# Patient Record
Sex: MALE | Race: WHITE | Employment: OTHER | URBAN - METROPOLITAN AREA
[De-identification: names, ages, dates, MRNs, and addresses within clinical notes are randomized per-mention and may not be internally consistent; named-entity substitution may affect disease eponyms.]

---

## 2023-10-30 ENCOUNTER — HOSPITAL ENCOUNTER (OUTPATIENT)
Age: 78
Setting detail: THERAPIES SERIES
Discharge: HOME | End: 2023-10-30
Payer: MEDICARE

## 2023-10-30 PROCEDURE — 97110 THERAPEUTIC EXERCISES: CPT

## 2023-10-30 PROCEDURE — 97161 PT EVAL LOW COMPLEX 20 MIN: CPT

## 2023-11-02 ENCOUNTER — HOSPITAL ENCOUNTER (OUTPATIENT)
Age: 78
Setting detail: THERAPIES SERIES
Discharge: HOME | End: 2023-11-02
Payer: MEDICARE

## 2023-11-02 PROCEDURE — 97110 THERAPEUTIC EXERCISES: CPT

## 2023-11-02 PROCEDURE — 97035 APP MDLTY 1+ULTRASOUND EA 15: CPT

## 2023-11-02 NOTE — FLOWSHEET NOTE
[x] [address] [facility]  Outpatient Rehabilitation &  Therapy  [address]: [phone]  F: [phone]             Physical Therapy Daily Treatment Note    Date:  2023  Patient Name:  Olive Mccall    :    MRN: [id number]  Physician: Wiley Phillips MD       Insurance: MEDICARE  Medical Diagnosis: Bilateral primary osteoarthritis of knee [M17.0]  Rehab Codes: M25.562, M25.561,   Onset Date: 6/10/2021    Next  [address] [place] [address]: unknown  Visit# / total visits: ; Progress note for Medicare patient due at visit 10     Cancels/No Shows: 0/0    Subjective:  Medial R knee pain noted after playing Pickleball for 2 hours this a.m. Pain:  [x] Yes  [] No Location: medial R knee joint line  Pain Rating: (0-10 scale) 5/10  Pain altered Tx:  [x] No  [] Yes  Action:  Comments: Medial R knee joint line is subjectively tender/painful to palpation today. Objective: Bilat knee extension deficits persist.  Modalities: Ultrasound over anteromedial R knee joint line x 8 mins @1.0 w/cm2, 50% power  Precautions: none  Exercises: NP = Not Performed  Exercise Reps/ Time Weight/ Level Comments   Airdyne  5 mins  Seat 9   Leg press - bilat                   - unilat 2 x 15   10 reps  L12  L6   L&R   Calf stretches on stairmaster 5 reps 15 secs each bilat   TKE with tubing 15 reps Dbl blue L&R    Hamstring curls with tubing 2 x 15 reps Dbl blue    LAQs  30 reps 5#                                  Manual stretches for knee extension 5 mins                                                     Other:      Treatment Charges: Mins Units   [x]  Modalities 8 1   [x]  Ther Exercise 25 2   [x]  Manual Therapy 5 0   []  Ther Activities     []  Neuro Re-ed     []  Vasocompression     [] Gait     []  Other     Total Treatment time 38 3   Medicare Cap Total=$184.51    Assessment: [x] Progressing toward goals. Pt reports no L knee pain during today's session. R knee sx were tolerable during activity. [] No change.      []

## 2023-11-06 ENCOUNTER — HOSPITAL ENCOUNTER (OUTPATIENT)
Age: 78
Setting detail: THERAPIES SERIES
Discharge: HOME | End: 2023-11-06
Payer: MEDICARE

## 2023-11-06 PROCEDURE — 97140 MANUAL THERAPY 1/> REGIONS: CPT

## 2023-11-06 PROCEDURE — 97110 THERAPEUTIC EXERCISES: CPT

## 2023-11-06 NOTE — FLOWSHEET NOTE
of Education: [] Verbal  [] Demo  [] Written  Comprehension of Education:  [x] Verbalizes understanding. [] Demonstrates understanding. [] Needs review. [] Demonstrates/verbalizes HEP/Ed previously given.      Plan: [x] Continue current frequency toward long and short term goals on a 2x/week basis   [] Specific Instructions for subsequent treatments: n/a      Time In:1330         Time Out: 1415    Electronically signed by:  Matt Chapa PT

## 2023-11-09 ENCOUNTER — APPOINTMENT (OUTPATIENT)
Age: 78
End: 2023-11-09
Payer: MEDICARE

## 2023-11-09 ENCOUNTER — HOSPITAL ENCOUNTER (OUTPATIENT)
Age: 78
Setting detail: THERAPIES SERIES
Discharge: HOME | End: 2023-11-09
Payer: MEDICARE

## 2023-11-09 PROCEDURE — 97110 THERAPEUTIC EXERCISES: CPT

## 2023-11-09 PROCEDURE — 97140 MANUAL THERAPY 1/> REGIONS: CPT

## 2023-11-14 ENCOUNTER — HOSPITAL ENCOUNTER (OUTPATIENT)
Age: 78
Setting detail: THERAPIES SERIES
Discharge: HOME | End: 2023-11-14
Payer: MEDICARE

## 2023-11-14 PROCEDURE — 97110 THERAPEUTIC EXERCISES: CPT

## 2023-11-14 PROCEDURE — 97140 MANUAL THERAPY 1/> REGIONS: CPT

## 2023-11-14 NOTE — FLOWSHEET NOTE
[x] [facility]37 [facility]  Outpatient Rehabilitation &  Therapy  [address]: [phone]  F: [phone]             Physical Therapy Daily Treatment Note    Date:  2023  Patient Name:  Olive Mccall    :    MRN: [id number]  Physician: Wiley Phillips MD       Insurance: MEDICARE  Medical Diagnosis: Bilateral primary osteoarthritis of knee [M17.0]  Rehab Codes: M25.562, M25.561,   Onset Date: 6/10/2021    Next Dr. Tammy Hopkins: unknown  Visit# / total visits: ; Progress note for Medicare patient due at visit 10     Cancels/No Shows: 0/0    Subjective:  Pt states his medial R knee pain was more pronounced after driving from [facility], [address] [place] [address] this weekend. The static position while driving that increases symptoms. Pain:  [x] Yes  [] No Location: medial R knee Pain Rating: (0-10 scale) 4/10  Pain altered Tx:  [x] No  [] Yes  Action:  Comments: Medial R knee joint line is subjectively tender/painful to palpation today. Objective: Gait is not antalgic today on the RLE, but pt does not attain full knee extension in swing phase. Modalities:   Precautions: none  Exercises: NP = Not Performed  Exercise Reps/ Time Weight/ Level Comments   Airdyne  5 mins  Seat 9   Leg press - bilat                   - unilat 2 x 15   2 x 10 reps  L14  L6.5   L&R   Calf stretches on stairmaster 5 reps 15 secs each bilat   TKE with tubing 15 reps Dbl blue L&R    BATCA hamstring curls   2 x 10 reps 35#    BATCA knee extension 2 x 10 reps 35#    Manual hamstring stretches  5 x 15 secs  L&R         Lateral stepovers  10 reps  8\" box                Manual stretches for knee extension 5 mins     Manual stretches of hip flexors/quads 5 x 15 secs  L&R                                              Other:  Access Code: [email or web address]  URL: [email or web address]  Date: 10/30/2023  Prepared by: Laura Freed     Exercises  - Supine Bridge  - 1-2 x daily - 5 x weekly - 2 sets - 12 reps - 2 secs hold  - Sidelying Hip

## 2023-11-17 ENCOUNTER — HOSPITAL ENCOUNTER (OUTPATIENT)
Age: 78
Setting detail: THERAPIES SERIES
Discharge: HOME | End: 2023-11-17
Payer: MEDICARE

## 2023-11-17 PROCEDURE — 97110 THERAPEUTIC EXERCISES: CPT

## 2023-11-17 PROCEDURE — 97140 MANUAL THERAPY 1/> REGIONS: CPT

## 2023-11-17 NOTE — FLOWSHEET NOTE
[x] [address]37 [facility]  Outpatient Rehabilitation &  Therapy  [address]: [phone]  F: [phone]             Physical Therapy Daily Treatment Note    Date:  2023  Patient Name:  Nitza Helton    :    MRN: [id number]  Physician: Robbin Cook MD       Insurance: MEDICARE  Medical Diagnosis: Bilateral primary osteoarthritis of knee [M17.0]  Rehab Codes: M25.562, M25.561,   Onset Date: 6/10/2021    Next  [address] [place] [address] [place]: unknown  Visit# / total visits: ; Progress note for Medicare patient due at visit 10     Cancels/No Shows: 0/0    Subjective:  Pt states his medial R knee pain is still prevalent while driving even for local distances. Pain/stiffness noted this a.m. upon arising, but pt played Pickleball this morning and now bilat knees feel fairly loose. .  Pain:  [x] Yes  [] No Location: medial R knee Pain Rating: (0-10 scale) 1/10  Pain altered Tx:  [x] No  [] Yes  Action:  Comments: Medial R knee joint line is subjectively tender/painful to palpation today. Objective: Gait is not antalgic today on the RLE, but pt does not attain full knee extension in swing phase. Modalities:   Precautions: none  Exercises: NP = Not Performed  Exercise Reps/ Time Weight/ Level Comments   Airdyne  5 mins  Seat 9   Leg press - bilat                   - unilat 2 x 15   2 x 10 reps  L14  L7   L&R   Calf stretches on stairmaster 5 reps 15 secs each bilat   TKE with tubing 15 reps Dbl blue L&R    Hamstring curls   2 x 15 reps Blue tubing (13#)    BATCA knee extension 2 x 10 reps 35#    Manual hamstring stretches  5 x 15 secs  L&R         Lateral stepovers  10 reps  8\" box                Manual stretches for knee extension 5 mins     Manual stretches of hip flexors/quads 5 x 15 secs  L&R                                              Other:  Access Code: [email or web address]  URL: [email or web address]  Date: 10/30/2023  Prepared by: Carlton Willard     Exercises  - Supine Bridge  - 1-2 x daily - 5 x

## 2023-11-21 ENCOUNTER — HOSPITAL ENCOUNTER (OUTPATIENT)
Age: 78
Setting detail: THERAPIES SERIES
Discharge: HOME | End: 2023-11-21
Payer: MEDICARE

## 2023-11-21 PROCEDURE — 97110 THERAPEUTIC EXERCISES: CPT

## 2023-11-21 PROCEDURE — 97140 MANUAL THERAPY 1/> REGIONS: CPT

## 2023-11-21 NOTE — FLOWSHEET NOTE
[x] 937 [facility]  Outpatient Rehabilitation &  Therapy  [address]: [phone]  F: [phone]             Physical Therapy Daily Treatment Note    Date:  2023  Patient Name:  Olive Mccall    :  [birth year]  MRN: [id number]  Physician: Wiley Phillips MD       Insurance: MEDICARE  Medical Diagnosis: Bilateral primary osteoarthritis of knee [M17.0]  Rehab Codes: M25.562, M25.561,   Onset Date: 6/10/2021    Next Dr. Tammy Hopkins: unknown  Visit# / total visits: ; Progress note for Medicare patient due at visit 10     Cancels/No Shows: 0/0    Subjective:  Pt states he has minimal knee pain today. He did play Pickleball this a.m. without sx increase. He has driven without increased medial R knee pain. Pain:  [x] Yes  [] No Location: medial R knee Pain Rating: (0-10 scale) 1/10  Pain altered Tx:  [x] No  [] Yes  Action:  Comments: Medial R knee joint line is subjectively tender/painful to palpation today. Objective: Active knee extension in supine lacks 10 degrees for R knee and 8 degrees for L knee  Modalities:   Precautions: none  Exercises: NP = Not Performed  Exercise Reps/ Time Weight/ Level Comments   Airdyne  6 mins  Seat 9 - conditioning   Leg press - bilat                   - unilat 2 x 10  2 x 10 reps  L14  L7   L&R   Calf stretches on stairmaster 5 reps 15 secs each bilat   TKE with tubing 15 reps Dbl blue L&R    Hamstring curls   2 x 15 reps Blue tubing (13#)    BATCA knee extension 3 x 10 reps 35#    Manual hamstring stretches  5 x 15 secs  L&R         Lateral stepovers  10 reps  8\" box                Manual stretches for knee extension 5 mins     Manual stretches of hip flexors/quads 5 x 15 secs  L&R                                              Other:  Access Code: [email or web address]  URL: [email or web address]  Date: 10/30/2023  Prepared by: Laura Freed     Exercises  - Supine Bridge  - 1-2 x daily - 5 x weekly - 2 sets - 12 reps - 2 secs hold  - Sidelying Hip Abduction  -

## 2023-11-28 ENCOUNTER — HOSPITAL ENCOUNTER (OUTPATIENT)
Age: 78
Setting detail: THERAPIES SERIES
Discharge: HOME | End: 2023-11-28
Payer: MEDICARE

## 2023-11-28 PROCEDURE — 97140 MANUAL THERAPY 1/> REGIONS: CPT

## 2023-11-28 PROCEDURE — 97110 THERAPEUTIC EXERCISES: CPT

## 2023-11-28 NOTE — FLOWSHEET NOTE
in the home and community. Patient goals: \"aleviate pain and stiffness, avoid surgery\" (knee replacements)    Pt. Education:  [x] Yes  [] No  [x] Reviewed Prior HEP/Ed  Method of Education: [] Verbal  [] Demo  [] Written  Comprehension of Education:  [x] Verbalizes understanding. [] Demonstrates understanding. [] Needs review. [] Demonstrates/verbalizes HEP/Ed previously given.      Plan: [x] Continue current frequency toward long and short term goals on a 2x/week basis   [] Specific Instructions for subsequent treatments: n/a      Time In: 1145     Time Out: 1235    Electronically signed by:  Ashlyn Mercado, PT

## 2023-11-30 ENCOUNTER — HOSPITAL ENCOUNTER (OUTPATIENT)
Age: 78
Setting detail: THERAPIES SERIES
Discharge: HOME | End: 2023-11-30
Payer: MEDICARE

## 2023-11-30 PROCEDURE — 97140 MANUAL THERAPY 1/> REGIONS: CPT

## 2023-11-30 PROCEDURE — 97110 THERAPEUTIC EXERCISES: CPT

## 2023-11-30 NOTE — FLOWSHEET NOTE
[x] [address]37 [facility]  Outpatient Rehabilitation &  Therapy  [address]: [phone]  F: [phone]             Physical Therapy Daily Treatment Note    Date:  2023  Patient Name:  Syl Denton    :  1945  MRN: [id number]  Physician: Andra Tijerina MD       Insurance: MEDICARE  Medical Diagnosis: Bilateral primary osteoarthritis of knee [M17.0]  Rehab Codes: M25.562, M25.561,   Onset Date: 6/10/2021    Next  [address] [place] [address] [place]: unknown  Visit# / total visits: ; Progress note for Medicare patient due at visit 10     Cancels/No Shows: 0/0    Subjective:  Pt reports he still has episodes of R>L knee pain when in long sitting or driving for long distances, but thinks improvement is noted regarding intensity of sx. Pain:  [x] Yes  [] No Location: medial R knee Pain Rating: (0-10 scale) 1/10  Pain altered Tx:  [x] No  [] Yes  Action:  Comments: Medial R knee joint line is minimalloy painful today    Objective: Active knee extension in supine lacks 10 degrees for R knee and 8 degrees for L knee  Modalities:   Precautions: none  Exercises: NP = Not Performed  Exercise Reps/ Time Weight/ Level Comments   Airdyne  6 mins  Seat 9 - conditioning   Leg press - bilat                   - unilat 3 x 10  2 x 10 reps  L14.5  L7.5   L&R   Calf stretches on stairmaster 5 reps 15 secs each bilat   TKE with tubing 15 reps Dbl blue L&R    Hamstring curls   2 x 15 reps Blue tubing (13#)    BATCA knee extension 3 x 10 reps 45#          Single leg stance 3 x 15 secs   L&R   Lateral stepovers  10 reps  8\" box    Heel/toe raises with 2 second hold at extreme of motion 15 reps           Manual stretches for knee extension NP     Manual stretches of hip flexors/quads 5 x 15 secs  L&R    Manual hamstring stretches  5 x 15 secs L&R                                        Other:  Access Code: [email or web address]  URL: [email or web address]  Date: 10/30/2023  Prepared by: Johnny Garcia  - Supine Bridge  - 1-2

## 2023-12-12 ENCOUNTER — HOSPITAL ENCOUNTER (OUTPATIENT)
Age: 78
Setting detail: THERAPIES SERIES
Discharge: HOME | End: 2023-12-12
Payer: MEDICARE

## 2023-12-12 PROCEDURE — 97140 MANUAL THERAPY 1/> REGIONS: CPT

## 2023-12-12 PROCEDURE — 97110 THERAPEUTIC EXERCISES: CPT

## 2023-12-12 NOTE — FLOWSHEET NOTE
[x] 937 [facility]  Outpatient Rehabilitation &  Therapy  [address] [facility] [address]: [phone]  F: [phone]             Physical Therapy Daily Treatment Note / Discharge Summary    Date:  2023  Patient Name:  Coco Lock    :    MRN: [id number]  Physician: Serina Hahn MD       Insurance: MEDICARE  Medical Diagnosis: Bilateral primary osteoarthritis of knee [M17.0]  Rehab Codes: M25.562, M25.561,   Onset Date: 6/10/2021    Next  [address] [facility]: unknown  Visit# / total visits: 10th and Last session is today     Cancels/No Shows: 0/0    Subjective:  Pt reports he was able to drive to and from [facility] without knee pain. He does notice some knee discomfort at night when lying in bed. He rates his improvement in bilat knee sx and function at 80%. Pain:  [] Yes  [x] No Location: n/a Pain Rating: (0-10 scale) 0/10  Pain altered Tx:  [x] No  [] Yes  Action:  Comments: Medial R knee joint line is not painful today    Objective: (2023) Active knee extension in supine lacks 6 degrees for R knee and 8 degrees for L knee. Bilat LE strength is grossly 5/5. Modalities: none  Precautions: none  Exercises: NP = Not Performed  Exercise Reps/ Time Weight/ Level Comments   Airdyne  6 mins  Seat 9 - conditioning   Leg press - bilat                   - unilat 3 x 10  2 x 10 reps  L14.5  L7.5   L&R   Calf stretches on stairmaster 5 reps 15 secs each bilat   TKE with tubing 15 reps Dbl blue L&R    Hamstring curls   2 x 15 reps Blue tubing (13#)    BATCA knee extension 3 x 10 reps 45#          Single leg stance 3 x 15 secs   L&R   Lateral stepovers  10 reps  8\" box    Heel/toe raises with 2 second hold at extreme of motion 15 reps           Manual stretches for knee extension NP     Manual stretches of hip flexors/quads 5 x 15 secs  L&R    Manual hamstring stretches  5 x 15 secs L&R                                        Other:  Access Code: [email or web address]  URL: [email or web address]  Date:

## 2023-12-14 ENCOUNTER — HOSPITAL ENCOUNTER (OUTPATIENT)
Age: 78
Setting detail: THERAPIES SERIES
Discharge: HOME | End: 2023-12-14
Payer: MEDICARE

## 2023-12-14 PROCEDURE — 97110 THERAPEUTIC EXERCISES: CPT

## 2023-12-14 PROCEDURE — 97161 PT EVAL LOW COMPLEX 20 MIN: CPT

## 2023-12-14 NOTE — CONSULTS
Time out:1500    Electronically signed by: Ryan Dye PT        Physician Signature:________________________________Date:__________________  By signing above or cosigning this note, I have reviewed this plan of care and certify a need for medically necessary rehabilitation services.      *PLEASE SIGN ABOVE AND FAX BACK ALL PAGES*

## 2023-12-28 ENCOUNTER — HOSPITAL ENCOUNTER (OUTPATIENT)
Age: 78
Setting detail: THERAPIES SERIES
Discharge: HOME | End: 2023-12-28
Payer: MEDICARE

## 2023-12-28 PROCEDURE — 97110 THERAPEUTIC EXERCISES: CPT

## 2023-12-28 PROCEDURE — 97140 MANUAL THERAPY 1/> REGIONS: CPT

## 2023-12-28 NOTE — FLOWSHEET NOTE
intensity during normal ADL. Independent with Home Exercise Programs    LTG (to be met in 20 treatments)  Pt will report 1/10 maximum low back sx during normal ADL and function in the community with minimal deficits. Pt will report successful management of low back sx via posture correction, exercise and body proper mechanics. Pt. Education:  [x] Yes - posture awareness in stance  [] No  [] Reviewed Prior HEP/Ed  Method of Education: [x] Verbal  [x] Demo  [] Written  Comprehension of Education:  [x] Verbalizes understanding. [] Demonstrates understanding. [] Needs review. [] Demonstrates/verbalizes HEP/Ed previously given.      Plan: [x] Continue current frequency toward long and short term goals on a 2x/week basis  [x] Specific Instructions for subsequent treatments: Lumbar ROM, core strength, mobilization of lumbar spine/tissue      Time In: 1415            Time Out: 1500    Electronically signed by:  Sarahi Benavidez, PT

## 2023-12-29 ENCOUNTER — HOSPITAL ENCOUNTER (OUTPATIENT)
Age: 78
Setting detail: THERAPIES SERIES
Discharge: HOME | End: 2023-12-29
Payer: MEDICARE

## 2023-12-29 PROCEDURE — 97110 THERAPEUTIC EXERCISES: CPT

## 2023-12-29 PROCEDURE — 97140 MANUAL THERAPY 1/> REGIONS: CPT

## 2023-12-29 NOTE — FLOWSHEET NOTE
ex rx from evaluation  Access Code: [email or web address]  URL: [email or web address]  Date: 12/14/2023  Prepared by: Conda Pat     Exercises  - Supine Double Knee to Chest  - 1-2 x daily - 3 x weekly - 1 sets - 10 reps - 5 seconds hold  - Supine Lower Trunk Rotation  - 1-2 x daily - 3 x weekly - 1 sets - 12 reps - 5 secs hold  - Supine Piriformis Stretch  - 1-2 x daily - 3 x weekly - 1 sets - 10 reps - 5 seconds hold  - Supine Piriformis Stretch (Mirrored)  - 1-2 x daily - 3 x weekly - 1 sets - 10 reps - 5 seconds hold  - Supine Transversus Abdominis Bracing - Hands on Ground  - 1-2 x daily - 3 x weekly - 1 sets - 10 reps - 2 hold  - Seated Lumbar Flexion Stretch  - 1-2 x daily - 3 x weekly - 1 sets - 5 reps - 5 secs hold    Treatment Charges: Mins Units   [x]  Modalities-US 8 0   [x]  Ther Exercise 30 2   [x]  Manual Therapy 10 1   []  Ther Activities     []  Neuro Re-ed     []  Vasocompression     [] Gait     []  Other     Total Billable time 40 3   Medicare cap total = $1144.40    Assessment: [x] Progressing toward goals. Pt's sx responded favorably to US today. [] No change. [] Other:  [x] Patient would continue to benefit from skilled physical therapy services in order to reduce muscular tightness and spasm via exercise, manual therapy and appropriate modalities. He will benefit from education for posture correction, body mechanics modification and sx management to return him to all normal ADL without deficits. STG (to be met in 10 treatments)  ? Pain: Pt will report 2/10 maximum low back pain during transfers and normal ADL intensity. ? ROM: Pt will demonstrate a 10% increase in all lumbar motion to facilitate function in the home and community with decreasing deficits. ? Posture: Pt will demonstrate a working knowledge of proper posture and body mechanics without verbal cues to reduce abnormal stress on lumbar spine. ?  Function: Pt will reports a 50% decrease in sx frequency and intensity during [Follow-Up Visit] : a follow-up visit for [Spouse] : spouse [FreeTextEntry2] : left ankle

## 2024-01-04 ENCOUNTER — HOSPITAL ENCOUNTER (OUTPATIENT)
Age: 79
Setting detail: THERAPIES SERIES
Discharge: HOME | End: 2024-01-04
Payer: MEDICARE

## 2024-01-04 PROCEDURE — 97140 MANUAL THERAPY 1/> REGIONS: CPT

## 2024-01-04 PROCEDURE — 97110 THERAPEUTIC EXERCISES: CPT

## 2024-01-04 PROCEDURE — 97012 MECHANICAL TRACTION THERAPY: CPT

## 2024-01-04 NOTE — FLOWSHEET NOTE
[x] [facility]  Outpatient Rehabilitation &  Therapy  [address] [facility].   P: [phone]  F: [phone]             Physical Therapy Daily Treatment Note    Date:  2024  Patient Name:  Cody Palacio    :  1945  MRN: [id number]  Physician: Jah Barrera MD      Insurance: MEDICARE / [facility](Based on medical necessity, no co-pay)   Medical Diagnosis: Strain of muscle, fascia and tendon of lower back, initial encounter [S39.012A        Rehab Codes: M54.50, M54.9, M54.59, M51.36, M62.830    Onset Date:       Next  Appt: Unknown  Visit# / total visits: ; Progress note for Medicare patient due at visit 10     Cancels/No Shows: 0/0    Subjective:  Pt states he continues to have L L/S pain that lingers at 2/10, but raises to 4/10 with sit to stand transfers and playing Pickleball.  Pain:  [x] Yes  [] No Location: bilat lumbar paraspinals  Pain Rating: (0-10 scale) 2/10  Pain altered Tx:  [x] No  [] Yes  Action:  Comments: L low back feels less stiff.    Objective: Pt is tender to palpation of central L3 and L4 as well as L lumbar paraspinals  Modalities: Pelvic traction, supine with flexion stool x 12 mins @ 55#/35# on a 60\"/10\" cycle    Exercises: NP=Not Performed  Exercise Reps/ Time Weight/ Level Comments   Sidestepping with T-tube for abdominal activation 15 reps   L & R Dbl blue    Rolling Triggerpoint roll up wall for lumbar extension 10 reps  NP   Standing lat pulls for abdominal activation 2 x 10 reps  25# NP   Mini-crunches 2 x 10 reps     Hooklying bridges 2x10 reps 2 x 10 reps    Hooklying marching 15 reps 4#    R sidelying spinal rotation with manual overpressure 10 x 5 secs each  NP   Bird dog 10 reps   NP               Airdyne  - seat 7 5 mins NP Alternate UE+LE / UE only each minute   Passive hamstring stretches 5 x 10 secs   L&R          Manual therapy  10  mins Grade IV Bilat UPAs, UPAs, Screw mobilization   Soft tissue mobilization  5 mins Grade IV L>R

## 2024-01-09 ENCOUNTER — HOSPITAL ENCOUNTER (OUTPATIENT)
Age: 79
Setting detail: THERAPIES SERIES
Discharge: HOME | End: 2024-01-09
Payer: MEDICARE

## 2024-01-09 PROCEDURE — 97012 MECHANICAL TRACTION THERAPY: CPT

## 2024-01-09 PROCEDURE — 97110 THERAPEUTIC EXERCISES: CPT

## 2024-01-09 NOTE — FLOWSHEET NOTE
[x] [facility]  Outpatient Rehabilitation &  Therapy  [address] [facility].   P: [phone]  F: [phone]             Physical Therapy Daily Treatment Note    Date:  2024  Patient Name:  Cody Palacio    :  1945  MRN: [id number]  Physician: Jah Barrera MD      Insurance: MEDICARE / [facility](Based on medical necessity, no co-pay)   Medical Diagnosis: Strain of muscle, fascia and tendon of lower back, initial encounter [S39.012A        Rehab Codes: M54.50, M54.9, M54.59, M51.36, M62.830    Onset Date:       Next  Appt: Unknown  Visit# / total visits: ; Progress note for Medicare patient due at visit 10     Cancels/No Shows: 0/0    Subjective:  Pt states he has no back sx today, but he didn't play Pickleball today.  Pain:  [] Yes  [x] No Location: bilat lumbar paraspinals  Pain Rating: (0-10 scale) 0/10  Pain altered Tx:  [x] No  [] Yes  Action:  Comments: Pt states he seems to have his most discomfort when he plays Pickleball > 2 hours.    Objective: Pt is tender to palpation of central L3 and L4 as well as L lumbar paraspinals  Modalities: Pelvic traction, supine with flexion stool x 15 mins @ 60#/35# on a 60\"/10\" cycle    Exercises: NP=Not Performed  Exercise Reps/ Time Weight/ Level Comments   Sidestepping with T-tube for abdominal activation 15 reps   L & R Dbl blue    Rolling Triggerpoint roll up wall for lumbar extension 10 reps  NP   Standing lat pulls for abdominal activation 2 x 10 reps  25# NP   Mini-crunches 2 x 10 reps     Hooklying bridges 2x10 reps     Hooklying marching 15 reps 4#    Dbl knee raise holding ball between knees 10 reps     Bird dog 10 reps   NP               Airdyne  - seat 7 5 mins NP Alternate UE+LE / UE only each minute   Passive hamstring stretches 5 x 10 secs   L&R          Manual therapy  NP Grade IV Bilat UPAs, UPAs, Screw mobilization   Soft tissue mobilization  NP Grade IV L>R lumbar paraspinals  Manual and Theragun    Standing R

## 2024-01-11 ENCOUNTER — HOSPITAL ENCOUNTER (OUTPATIENT)
Age: 79
Setting detail: THERAPIES SERIES
Discharge: HOME | End: 2024-01-11
Payer: MEDICARE

## 2024-01-11 PROCEDURE — 97110 THERAPEUTIC EXERCISES: CPT

## 2024-01-11 PROCEDURE — 97012 MECHANICAL TRACTION THERAPY: CPT

## 2024-01-11 NOTE — FLOWSHEET NOTE
ROM: Pt will demonstrate a 10% increase in all lumbar motion to facilitate function in the home and community with decreasing deficits.  ? Posture: Pt will demonstrate a working knowledge of proper posture and body mechanics without verbal cues to reduce abnormal stress on lumbar spine.  ? Function: Pt will reports a 50% decrease in sx frequency and intensity during normal ADL.  Independent with Home Exercise Programs    LTG (to be met in 20 treatments)  Pt will report 1/10 maximum low back sx during normal ADL and function in the community with minimal deficits.  Pt will report successful management of low back sx via posture correction, exercise and body proper mechanics.    Pt. Education:  [x] Yes - posture awareness   [] No  [] Reviewed Prior HEP/Ed  Method of Education: [x] Verbal  [x] Demo  [] Written  Comprehension of Education:  [x] Verbalizes understanding.  [] Demonstrates understanding.  [] Needs review.  [] Demonstrates/verbalizes HEP/Ed previously given.     Plan: [x] Continue current frequency toward long and short term goals on a 2x/week basis  [x] Specific Instructions for subsequent treatments: Lumbar ROM, core strength, mobilization of lumbar spine/tissue      Time In: 1145         Time Out: 1235    Electronically signed by:  Keon Burks, PT

## 2024-01-16 ENCOUNTER — HOSPITAL ENCOUNTER (OUTPATIENT)
Age: 79
Setting detail: THERAPIES SERIES
Discharge: HOME | End: 2024-01-16
Payer: MEDICARE

## 2024-01-16 PROCEDURE — 97110 THERAPEUTIC EXERCISES: CPT

## 2024-01-16 PROCEDURE — 97012 MECHANICAL TRACTION THERAPY: CPT

## 2024-01-16 NOTE — FLOWSHEET NOTE
[x] [facility]  Outpatient Rehabilitation &  Therapy  [address] [facility].   P: [phone]  F: [phone]             Physical Therapy Daily Treatment Note    Date:  2024  Patient Name:  Cody Palacio    :  1945  MRN: [id number]  Physician: Jah Barrera MD      Insurance: MEDICARE / [facility](Based on medical necessity, no co-pay)   Medical Diagnosis: Strain of muscle, fascia and tendon of lower back, initial encounter [S39.012A        Rehab Codes: M54.50, M54.9, M54.59, M51.36, M62.830    Onset Date:       Next  Appt: Unknown  Visit# / total visits: ; Progress note for Medicare patient due at visit 10     Cancels/No Shows: 0/0    Subjective:  Pt states he has minimal low back stiffness and none of what he would call pain. He states he thinks the traction is helpful.   Pain:  [] Yes  [x] No Location: bilat lumbar paraspinals  Pain Rating: (0-10 scale) 0/10  Pain altered Tx:  [x] No  [] Yes  Action:  Comments: Pt states he played 3 games of Pickle ball today without sx provocation.    Objective: L lumbar paraspinal tightness has greatly decreased.   Modalities: Pelvic traction, supine with flexion stool x 15 mins @ 65#/40# on a 60\"/10\" cycle    Exercises: NP=Not Performed  Exercise Reps/ Time Weight/ Level Comments   Sidestepping with T-tube for abdominal activation 15 reps   L & R Dbl blue NP   Rolling Triggerpoint roll up wall for lumbar extension 10 reps  NP   Standing lat pulls for abdominal activation 2 x 10 reps  25# NP   Mini-crunches 2 x 10 reps     Hooklying bridges 2x10 reps  NP   Hooklying marching with knee & hip extension 15 reps 4#    Dbl knee raise holding ball between knees 2 x 10 reps     Bird dog 10 reps                  Airdyne  - seat 7 5 mins  Alternate UE+LE / UE only each minute   Passive hamstring stretches 5 x 10 secs  NP L&R          Manual therapy  NP Grade IV Bilat UPAs, UPAs, Screw mobilization   Soft tissue mobilization  NP Grade IV L>R

## 2024-01-18 ENCOUNTER — APPOINTMENT (OUTPATIENT)
Age: 79
End: 2024-01-18
Payer: MEDICARE

## 2024-01-18 ENCOUNTER — HOSPITAL ENCOUNTER (OUTPATIENT)
Age: 79
Setting detail: THERAPIES SERIES
Discharge: HOME | End: 2024-01-18
Payer: MEDICARE

## 2024-01-18 PROCEDURE — 97012 MECHANICAL TRACTION THERAPY: CPT

## 2024-01-18 PROCEDURE — 97110 THERAPEUTIC EXERCISES: CPT

## 2024-01-18 NOTE — FLOWSHEET NOTE
[x] [facility]  Outpatient Rehabilitation &  Therapy  [address].   P: [phone]  F: [phone]             Physical Therapy Progress Note / Daily Treatment Note    Date:  2024  Patient Name:  Cody Palacio    :  1945  MRN: [id number]  Physician: Jah Barrera MD      Insurance: MEDICARE / [facility](Based on medical necessity, no co-pay)   Medical Diagnosis: Strain of muscle, fascia and tendon of lower back, initial encounter [S39.012A        Rehab Codes: M54.50, M54.9, M54.59, M51.36, M62.830    Onset Date:       Next  Appt: Unknown  Visit# / total visits: 10/20; Progress note for Medicare patient due at visit 10     Cancels/No Shows: 0/0    Subjective:  Pt states he has no low back or LE pain today. He will drive to [facility] today which will test his state of rehab.   Pain:  [] Yes  [x] No Location: bilat lumbar paraspinals  Pain Rating: (0-10 scale) 0/10  Pain altered Tx:  [x] No  [] Yes  Action:  Comments: Pt states he is experiencing an 80% decrease in low back sx frequency and intensity.    Objective: Lumbar AROM in % of normal range = flexion 90%, extension 75, lateral flexion L  75 R 75,  rotation L 75 R 75. No pain reporduced with lumbar AROM.  Modalities: Pelvic traction, supine with flexion stool x 15 mins @ 65#/40# on a 60\"/10\" cycle    Exercises: NP=Not Performed  Exercise Reps/ Time Weight/ Level Comments   Sidestepping with T-tube for abdominal activation 15 reps   L & R Dbl blue NP   Rolling Triggerpoint roll up wall for lumbar extension 10 reps  NP   Standing lat pulls for abdominal activation 15 reps  25#    Mini-crunches 2 x 10 reps     Hooklying bridges 12 reps     Hooklying marching with knee & hip extension 15 reps 4#    Dbl knee raise holding ball between knees 2 x 10 reps     Bird dog 10 reps                  Airdyne  - seat 7 5 mins  Alternate UE+LE / UE only each minute   Passive hamstring stretches 5 x 10 secs  NP L&R

## 2024-01-30 ENCOUNTER — APPOINTMENT (OUTPATIENT)
Age: 79
End: 2024-01-30
Payer: MEDICARE

## 2024-01-30 ENCOUNTER — HOSPITAL ENCOUNTER (OUTPATIENT)
Age: 79
Setting detail: THERAPIES SERIES
Discharge: HOME | End: 2024-01-30
Payer: MEDICARE

## 2024-01-30 PROCEDURE — G0283 ELEC STIM OTHER THAN WOUND: HCPCS

## 2024-01-30 PROCEDURE — 97012 MECHANICAL TRACTION THERAPY: CPT

## 2024-01-30 PROCEDURE — 97110 THERAPEUTIC EXERCISES: CPT

## 2024-01-30 NOTE — FLOWSHEET NOTE
[x] [facility]  Outpatient Rehabilitation &  Therapy  [address] [facility] [address] [facility].   P: [phone]  F: [phone]             Physical Therapy Daily Treatment Note    Date:  2024  Patient Name:  Cody Palacio    :  1945  MRN: [id number]  Physician: Jah Barrera MD      Insurance: MEDICARE / [facility](Based on medical necessity, no co-pay)   Medical Diagnosis: Strain of muscle, fascia and tendon of lower back, initial encounter [S39.012A        Rehab Codes: M54.50, M54.9, M54.59, M51.36, M62.830    Onset Date:       Next  Appt: Unknown  Visit# / total visits: ; Progress note for Medicare patient due at visit 10     Cancels/No Shows: 0/0    Subjective:  Pt states he has no low back today even though he played Pickleball x 3 hours yesterday and 2 hours today.  Pain:  [] Yes  [x] No Location: bilat lumbar paraspinals  Pain Rating: (0-10 scale) 0/10  Pain altered Tx:  [x] No  [] Yes  Action:  Comments: Pt states he is experiencing an 80% decrease in low back sx frequency and intensity.    Objective: Lumbar AROM in % of normal range = flexion 90%, extension 75, lateral flexion L  75 R 75,  rotation L 75 R 75. No pain reporduced with lumbar AROM.  Modalities: Pelvic traction, supine with flexion stool x 15 mins @ 65#/40# on a 60\"/10\" cycle    Exercises: NP=Not Performed  Exercise Reps/ Time Weight/ Level Comments   Sidestepping with T-tube for abdominal activation 15 reps   L & R Dbl blue NP   Rolling Triggerpoint roll up wall for lumbar extension 10 reps     Standing lat pulls for abdominal activation 2 x 10  reps  25#    Mini-crunches 2 x 10 reps NP    Hooklying bridges 12 reps     Hooklying marching with knee & hip extension 15 reps 4#    Dbl knee raise holding ball between knees 2 x 10 reps     Bird dog 10 reps                  Airdyne  - seat 7 5 mins  Alternate UE+LE / UE only each minute   Passive hamstring stretches 5 x 10 secs  NP L&R          Manual therapy  5 mins

## 2024-02-01 ENCOUNTER — HOSPITAL ENCOUNTER (OUTPATIENT)
Age: 79
Setting detail: THERAPIES SERIES
Discharge: HOME | End: 2024-02-01
Payer: MEDICARE

## 2024-02-01 PROCEDURE — 97110 THERAPEUTIC EXERCISES: CPT

## 2024-02-01 PROCEDURE — 97012 MECHANICAL TRACTION THERAPY: CPT

## 2024-02-01 NOTE — FLOWSHEET NOTE
Airdyne  - seat 7 5 mins  Alternate UE+LE / UE only each minute   Passive hamstring stretches 5 x 10 secs  NP L&R          Manual therapy  5 mins Grade IV Bilat UPAs, UPAs, Screw mobilization   Soft tissue mobilization  NP Grade IV L>R lumbar paraspinals  Manual and Theragun    Standing R sidebends with L arm on wall 10 x 5 secs each                                   Other: Home ex rx from evaluation  Access Code: [email or web address]  URL: [email or web address]  Date: 01/04/2024  Prepared by: Keon Burks    Exercises  - Standing Sidebends  - 2 x daily - 5 x weekly - 1 sets - 5 reps - 10 hold  Access Code: [email or web address]  URL: [email or web address]  Date: 12/14/2023  Prepared by: Keon Burks     Exercises  - Supine Double Knee to Chest  - 1-2 x daily - 3 x weekly - 1 sets - 10 reps - 5 seconds hold  - Supine Lower Trunk Rotation  - 1-2 x daily - 3 x weekly - 1 sets - 12 reps - 5 secs hold  - Supine Piriformis Stretch  - 1-2 x daily - 3 x weekly - 1 sets - 10 reps - 5 seconds hold  - Supine Piriformis Stretch (Mirrored)  - 1-2 x daily - 3 x weekly - 1 sets - 10 reps - 5 seconds hold  - Supine Transversus Abdominis Bracing - Hands on Ground  - 1-2 x daily - 3 x weekly - 1 sets - 10 reps - 2 hold  - Seated Lumbar Flexion Stretch  - 1-2 x daily - 3 x weekly - 1 sets - 5 reps - 5 secs hold    Access Code: [email or web address]  URL: [email or web address]  Date: 02/01/2024  Prepared by: Keon Burks    Program Notes  Use your hands on the arms of the chair to increase the stretch intensity    Exercises  - Seated Thoracic Flexion and Rotation with Arms Crossed  - 1-2 x daily - 3-4 x weekly - 1 sets - 10 reps - 5 secs. hold  Treatment Charges: Mins Units   [x]  Modalities-pelvic traction 15 1   [x]  Ther Exercise 20 1   []  Manual Therapy     []  Ther Activities     []  Neuro Re-ed     []  Vasocompression     [] Gait     []  Other     Total Billable time 35 2   Medicare Therapy cap total = $398.56    Assessment: [x]